# Patient Record
Sex: FEMALE | Race: WHITE | NOT HISPANIC OR LATINO | Employment: OTHER | URBAN - METROPOLITAN AREA
[De-identification: names, ages, dates, MRNs, and addresses within clinical notes are randomized per-mention and may not be internally consistent; named-entity substitution may affect disease eponyms.]

---

## 2020-02-26 ENCOUNTER — OFFICE VISIT (OUTPATIENT)
Dept: UROLOGY | Facility: CLINIC | Age: 70
End: 2020-02-26
Payer: MEDICARE

## 2020-02-26 VITALS
DIASTOLIC BLOOD PRESSURE: 80 MMHG | HEART RATE: 75 BPM | BODY MASS INDEX: 28.16 KG/M2 | HEIGHT: 62 IN | WEIGHT: 153 LBS | SYSTOLIC BLOOD PRESSURE: 130 MMHG

## 2020-02-26 DIAGNOSIS — R32 INCONTINENCE IN FEMALE: Primary | ICD-10-CM

## 2020-02-26 LAB
BILIRUB UR QL STRIP: NEGATIVE
GLUCOSE UR QL STRIP: NEGATIVE
KETONES UR QL STRIP: NEGATIVE
LEUKOCYTE ESTERASE UR QL STRIP: NEGATIVE
PH, POC UA: 5.5
POC AMORP, URINE: ABNORMAL
POC BACTI, URINE: ABNORMAL
POC BLOOD, URINE: POSITIVE
POC CASTS, URINE: ABNORMAL
POC CRYST, URINE: ABNORMAL
POC EPITH, URINE: ABNORMAL
POC HCG, URINE: ABNORMAL
POC HYALIN, URINE: ABNORMAL LPF
POC MUCUS, URINE: ABNORMAL
POC NITRATES, URINE: NEGATIVE
POC OTHER, URINE: ABNORMAL
POC RBC, URINE: ABNORMAL HPF
POC WBC, URINE: ABNORMAL HPF
PROT UR QL STRIP: NEGATIVE
PVR: 50 WU
SP GR UR STRIP: 1.02 (ref 1–1.03)
UROBILINOGEN UR STRIP-ACNC: 0.2 (ref 0.1–1.1)

## 2020-02-26 PROCEDURE — 99213 OFFICE O/P EST LOW 20 MIN: CPT | Mod: S$GLB,,, | Performed by: UROLOGY

## 2020-02-26 PROCEDURE — 99213 PR OFFICE/OUTPT VISIT, EST, LEVL III, 20-29 MIN: ICD-10-PCS | Mod: S$GLB,,, | Performed by: UROLOGY

## 2020-02-26 RX ORDER — ASPIRIN 81 MG/1
81 TABLET ORAL DAILY
COMMUNITY
End: 2021-06-29 | Stop reason: SDUPTHER

## 2020-02-26 RX ORDER — ATORVASTATIN CALCIUM 20 MG/1
TABLET, FILM COATED ORAL
COMMUNITY
Start: 2020-01-27

## 2020-02-26 RX ORDER — CLOPIDOGREL BISULFATE 75 MG/1
TABLET ORAL
COMMUNITY
Start: 2019-12-21

## 2020-02-26 RX ORDER — PANTOPRAZOLE SODIUM 40 MG/1
TABLET, DELAYED RELEASE ORAL
COMMUNITY
Start: 2019-12-21

## 2020-02-26 RX ORDER — NITROGLYCERIN 0.4 MG/1
TABLET SUBLINGUAL
COMMUNITY
Start: 2020-01-07

## 2020-02-26 RX ORDER — MONTELUKAST SODIUM 10 MG/1
TABLET ORAL
COMMUNITY
Start: 2020-01-04

## 2020-02-26 RX ORDER — METOPROLOL SUCCINATE 50 MG/1
TABLET, EXTENDED RELEASE ORAL
COMMUNITY
Start: 2020-02-19

## 2020-02-26 RX ORDER — LINACLOTIDE 72 UG/1
CAPSULE, GELATIN COATED ORAL
COMMUNITY
Start: 2020-01-13 | End: 2022-10-19

## 2020-02-26 NOTE — PROGRESS NOTES
Subjective:       Patient ID: Kinza Cole is a 69 y.o. female.    Chief Complaint: Other (yearly )      HPI: 68 yo female history of nithin etc rectified by surgwry in past.  Denies any leakage.  States on rare occasion will get up at night to urinate.       Past Medical History:   Past Medical History:   Diagnosis Date    GERD (gastroesophageal reflux disease)     High cholesterol     Hypertension     IBS (irritable bowel syndrome)     Macular degeneration disease     Osteoporosis        Past Surgical Historical:   Past Surgical History:   Procedure Laterality Date    CHOLECYSTECTOMY      FOOT SURGERY      NOSE SURGERY          Medications:   Medication List with Changes/Refills   Current Medications    ASPIRIN (ECOTRIN) 81 MG EC TABLET    Take 81 mg by mouth once daily.    ATORVASTATIN (LIPITOR) 20 MG TABLET        CLOPIDOGREL (PLAVIX) 75 MG TABLET        LINZESS 72 MCG CAP CAPSULE        MIRABEGRON (MYRBETRIQ) 25 MG TB24 ER TABLET    Take 1 tablet (25 mg total) by mouth once daily.    MIRABEGRON (MYRBETRIQ) 25 MG TB24 ER TABLET    Myrbetriq 25 mg tablet,extended release    MONTELUKAST (SINGULAIR) 10 MG TABLET        NITROGLYCERIN (NITROSTAT) 0.4 MG SL TABLET        PANTOPRAZOLE (PROTONIX) 40 MG TABLET        TOPROL XL 50 MG 24 HR TABLET            Past Social History:   Social History     Socioeconomic History    Marital status:      Spouse name: Not on file    Number of children: Not on file    Years of education: Not on file    Highest education level: Not on file   Occupational History    Not on file   Social Needs    Financial resource strain: Not on file    Food insecurity:     Worry: Not on file     Inability: Not on file    Transportation needs:     Medical: Not on file     Non-medical: Not on file   Tobacco Use    Smoking status: Never Smoker   Substance and Sexual Activity    Alcohol use: Not Currently    Drug use: Never    Sexual activity: Not on file   Lifestyle    Physical  activity:     Days per week: Not on file     Minutes per session: Not on file    Stress: Not on file   Relationships    Social connections:     Talks on phone: Not on file     Gets together: Not on file     Attends Mu-ism service: Not on file     Active member of club or organization: Not on file     Attends meetings of clubs or organizations: Not on file     Relationship status: Not on file   Other Topics Concern    Not on file   Social History Narrative    Not on file       Allergies:   Review of patient's allergies indicates:   Allergen Reactions    Sulfa (sulfonamide antibiotics)         Family History:   Family History   Problem Relation Age of Onset    Heart disease Father     Kidney cancer Mother         Review of Systems:  Review of Systems   Constitutional: Negative for activity change and appetite change.   HENT: Negative for congestion and dental problem.    Respiratory: Negative for chest tightness and shortness of breath.    Cardiovascular: Negative for chest pain.   Gastrointestinal: Negative for abdominal distention.   Genitourinary: Negative for decreased urine volume, difficulty urinating, dyspareunia, dysuria, enuresis, flank pain, frequency, genital sores, hematuria, pelvic pain and urgency.   Musculoskeletal: Negative for back pain and neck pain.   Allergic/Immunologic: Negative for immunocompromised state.   Neurological: Negative for dizziness.   Hematological: Negative for adenopathy.   Psychiatric/Behavioral: Negative for agitation, behavioral problems and confusion.       Physical Exam:  Physical Exam   Nursing note and vitals reviewed.  Constitutional: She is oriented to person, place, and time. She appears well-developed and well-nourished.   HENT:   Head: Normocephalic.   Cardiovascular: Normal rate, regular rhythm and normal heart sounds.    Pulmonary/Chest: Effort normal and breath sounds normal.   Abdominal: Soft. Bowel sounds are normal.   Genitourinary:          Neurological: She is alert and oriented to person, place, and time.   Skin: Skin is warm and dry.     ua is nl  Catheterized pvr is 50    Assessment/Plan:     history of nithin etc no eveidence for recurrence--fu one year  Problem List Items Addressed This Visit     None      Visit Diagnoses     Incontinence in female    -  Primary    Relevant Orders    POCT Urinalysis (w/Micro Option)    Straight Cath

## 2020-06-17 ENCOUNTER — PATIENT MESSAGE (OUTPATIENT)
Dept: OBSTETRICS AND GYNECOLOGY | Facility: CLINIC | Age: 70
End: 2020-06-17

## 2020-06-17 NOTE — TELEPHONE ENCOUNTER
[facility] and instructions

## 2020-08-04 ENCOUNTER — CLINICAL SUPPORT (OUTPATIENT)
Dept: UROLOGY | Facility: CLINIC | Age: 70
End: 2020-08-04
Payer: MEDICARE

## 2020-08-04 ENCOUNTER — TELEPHONE (OUTPATIENT)
Dept: UROLOGY | Facility: CLINIC | Age: 70
End: 2020-08-04

## 2020-08-04 DIAGNOSIS — R30.0 DYSURIA: Primary | ICD-10-CM

## 2020-08-04 RX ORDER — NITROFURANTOIN (MACROCRYSTALS) 100 MG/1
100 CAPSULE ORAL EVERY 12 HOURS
Qty: 14 CAPSULE | Refills: 0 | Status: SHIPPED | OUTPATIENT
Start: 2020-08-04 | End: 2020-08-11

## 2020-08-04 NOTE — TELEPHONE ENCOUNTER
Patient notified that medication has been called in to  [facility]  Until her culture comes back.  Patient verbalized understanding

## 2020-08-06 ENCOUNTER — TELEPHONE (OUTPATIENT)
Dept: UROLOGY | Facility: CLINIC | Age: 70
End: 2020-08-06

## 2020-08-06 LAB — URINE CULTURE, ROUTINE: NORMAL

## 2020-08-06 NOTE — TELEPHONE ENCOUNTER
----- Message from Melony Abarca sent at 8/6/2020 11:19 AM CDT -----  Regarding: call back  Type:  Patient Returning Call    Who Called:Kinza Cole  Who Left Message for Patient she does not know  Does the patient know what this is regarding?: call back  Would the patient rather a call back or a response via [facility]? no  Best Call Back Number:[phone]

## 2020-08-07 ENCOUNTER — TELEPHONE (OUTPATIENT)
Dept: UROLOGY | Facility: CLINIC | Age: 70
End: 2020-08-07

## 2020-08-07 NOTE — TELEPHONE ENCOUNTER
----- Message from Jann Sierra NP sent at 8/6/2020 10:04 AM CDT -----  Please call the patient regarding her abnormal result.  Notify positive urine culture, sensitive to Macrobid I prescribed.  Continue same till complete

## 2021-04-05 ENCOUNTER — OFFICE VISIT (OUTPATIENT)
Dept: UROLOGY | Facility: CLINIC | Age: 71
End: 2021-04-05
Payer: MEDICARE

## 2021-04-05 VITALS — HEART RATE: 81 BPM | RESPIRATION RATE: 18 BRPM | SYSTOLIC BLOOD PRESSURE: 166 MMHG | DIASTOLIC BLOOD PRESSURE: 68 MMHG

## 2021-04-05 DIAGNOSIS — R30.0 DYSURIA: Primary | ICD-10-CM

## 2021-04-05 LAB — POC RESIDUAL URINE VOLUME: 2 ML (ref 0–100)

## 2021-04-05 PROCEDURE — 99213 PR OFFICE/OUTPT VISIT, EST, LEVL III, 20-29 MIN: ICD-10-PCS | Mod: S$GLB,,, | Performed by: UROLOGY

## 2021-04-05 PROCEDURE — 51798 POCT BLADDER SCAN: ICD-10-PCS | Mod: S$GLB,,, | Performed by: UROLOGY

## 2021-04-05 PROCEDURE — 51798 US URINE CAPACITY MEASURE: CPT | Mod: S$GLB,,, | Performed by: UROLOGY

## 2021-04-05 PROCEDURE — 99213 OFFICE O/P EST LOW 20 MIN: CPT | Mod: S$GLB,,, | Performed by: UROLOGY

## 2021-06-29 ENCOUNTER — OFFICE VISIT (OUTPATIENT)
Dept: OBSTETRICS AND GYNECOLOGY | Facility: CLINIC | Age: 71
End: 2021-06-29
Payer: MEDICARE

## 2021-06-29 VITALS
BODY MASS INDEX: 28.93 KG/M2 | HEIGHT: 62 IN | HEART RATE: 70 BPM | WEIGHT: 157.19 LBS | DIASTOLIC BLOOD PRESSURE: 70 MMHG | SYSTOLIC BLOOD PRESSURE: 120 MMHG

## 2021-06-29 DIAGNOSIS — Z01.419 ENCOUNTER FOR ROUTINE GYNECOLOGIC EXAMINATION IN MEDICARE PATIENT: Primary | ICD-10-CM

## 2021-06-29 PROCEDURE — G0101 CA SCREEN;PELVIC/BREAST EXAM: HCPCS | Mod: S$GLB,,, | Performed by: OBSTETRICS & GYNECOLOGY

## 2021-06-29 PROCEDURE — G0101 PR CA SCREEN;PELVIC/BREAST EXAM: ICD-10-PCS | Mod: S$GLB,,, | Performed by: OBSTETRICS & GYNECOLOGY

## 2021-06-29 RX ORDER — ESTRADIOL 10 UG/1
INSERT VAGINAL
COMMUNITY
End: 2022-06-30 | Stop reason: SDUPTHER

## 2021-06-29 RX ORDER — DENOSUMAB 60 MG/ML
60 INJECTION SUBCUTANEOUS
COMMUNITY

## 2022-03-07 ENCOUNTER — TELEPHONE (OUTPATIENT)
Dept: UROLOGY | Facility: CLINIC | Age: 72
End: 2022-03-07
Payer: MEDICARE

## 2022-03-07 NOTE — TELEPHONE ENCOUNTER
I called pt to notify we received fax from [facility] to refill myrbetriq but no upcoming appointments and pt last saw Dr CORNELL. She states she thinks her pcp can fill for her. I advised to call us for any issues related to that.

## 2022-06-30 ENCOUNTER — OFFICE VISIT (OUTPATIENT)
Dept: OBSTETRICS AND GYNECOLOGY | Facility: CLINIC | Age: 72
End: 2022-06-30
Payer: MEDICARE

## 2022-06-30 VITALS
HEIGHT: 62 IN | BODY MASS INDEX: 28.31 KG/M2 | HEART RATE: 70 BPM | DIASTOLIC BLOOD PRESSURE: 57 MMHG | SYSTOLIC BLOOD PRESSURE: 130 MMHG | WEIGHT: 153.81 LBS

## 2022-06-30 DIAGNOSIS — N76.2 ACUTE VULVITIS: ICD-10-CM

## 2022-06-30 DIAGNOSIS — N95.2 VAGINAL ATROPHY: Primary | ICD-10-CM

## 2022-06-30 PROCEDURE — 99213 PR OFFICE/OUTPT VISIT, EST, LEVL III, 20-29 MIN: ICD-10-PCS | Mod: S$GLB,,, | Performed by: OBSTETRICS & GYNECOLOGY

## 2022-06-30 PROCEDURE — 99213 OFFICE O/P EST LOW 20 MIN: CPT | Mod: S$GLB,,, | Performed by: OBSTETRICS & GYNECOLOGY

## 2022-06-30 RX ORDER — CYCLOBENZAPRINE HCL 5 MG
5 TABLET ORAL 2 TIMES DAILY
COMMUNITY
Start: 2022-05-24 | End: 2022-10-19

## 2022-06-30 RX ORDER — ESTRADIOL 0.1 MG/G
0.5 CREAM VAGINAL DAILY
Qty: 42.5 G | Refills: 1 | Status: SHIPPED | OUTPATIENT
Start: 2022-06-30 | End: 2023-06-30

## 2022-06-30 RX ORDER — ESTRADIOL 10 UG/1
1 INSERT VAGINAL
Qty: 24 TABLET | Refills: 4 | Status: SHIPPED | OUTPATIENT
Start: 2022-06-30

## 2022-06-30 NOTE — PROGRESS NOTES
"  Chief Complaint: vulvar itching     HPI:      Kinza Cole is a 71 y.o. female  who presents complaining of pruritus at the medial aspect of the labia.  She used a moisturizer and things seemed to improve.  No LMP recorded. Patient is postmenopausal.    Past Medical History:   Diagnosis Date    GERD (gastroesophageal reflux disease)     Hyperlipidemia     Hypertension     IBS (irritable bowel syndrome)     Incontinence of urine in female     Long-term current use of testosterone replacement therapy     Macular degeneration disease     Osteoporosis       Past Surgical History:   Procedure Laterality Date    CHOLECYSTECTOMY      DILATION AND CURETTAGE OF UTERUS      FOOT SURGERY      NOSE SURGERY        Social History     Tobacco Use    Smoking status: Never Smoker    Smokeless tobacco: Never Used   Substance Use Topics    Alcohol use: Yes     Comment: "occasionally"    Drug use: Never      Current Outpatient Medications on File Prior to Visit   Medication Sig Dispense Refill    aspirin (ASPIR-81 ORAL) Aspir-81      atorvastatin (LIPITOR) 20 MG tablet       clopidogreL (PLAVIX) 75 mg tablet       cyclobenzaprine (FLEXERIL) 5 MG tablet Take 5 mg by mouth 2 (two) times daily.      denosumab (PROLIA) 60 mg/mL Syrg Inject 60 mg into the skin every 6 (six) months.      LINZESS 72 mcg Cap capsule       mirabegron (MYRBETRIQ) 25 mg Tb24 ER tablet Take 1 tablet (25 mg total) by mouth once daily. 90 tablet 3    montelukast (SINGULAIR) 10 mg tablet       nitroGLYCERIN (NITROSTAT) 0.4 MG SL tablet       pantoprazole (PROTONIX) 40 MG tablet       TOPROL XL 50 mg 24 hr tablet       [DISCONTINUED] estradioL (VAGIFEM) 10 mcg Tab Place vaginally twice a week.      [DISCONTINUED] famotidine (PEPCID ORAL) Take by mouth as needed.       No current facility-administered medications on file prior to visit.      Review of patient's allergies indicates:   Allergen Reactions    Sulfa (sulfonamide " "antibiotics)           ROS:     GENERAL: Denies weight gain or weight loss. Feeling well overall.   SKIN: Denies rash or lesions.   NODES: Denies enlarged lymph nodes.   CARDIOVASCULAR: Denies palpitations or chest pain.   ABDOMEN: Denies abdominal pain, constipation, diarrhea, nausea, vomiting or rectal bleeding.   URINARY: Denies frequency, dysuria, hematuria, or burning on urination.  REPRODUCTIVE: See HPI.   BREASTS: Denies pain, lumps, or nipple discharge.   HEMATOLOGIC: Denies easy bruisability or excessive bleeding with the exception of menstrual cycles.  PSYCHIATRIC: Denies depression, anxiety or mood swings.   Physical Exam:      BP (!) 130/57   Pulse 70   Ht 5' 2" (1.575 m)   Wt 69.8 kg (153 lb 12.8 oz)   BMI 28.13 kg/m²   Body mass index is 28.13 kg/m².     PELVIC: Normal external genitalia but with pale thin skin at medial aspect of labia and includes the perineum and perianal area      Assessment/Plan:     Vaginal atrophy  -     estradioL (VAGIFEM) 10 mcg Tab; Place 1 tablet (10 mcg total) vaginally twice a week.  Dispense: 24 tablet; Refill: 4    Acute vulvitis  -     estradioL (ESTRACE) 0.01 % (0.1 mg/gram) vaginal cream; Place 0.5 g vaginally once daily.  Dispense: 42.5 g; Refill: 1        Follow up in 2 weeks  "

## 2022-07-06 ENCOUNTER — TELEPHONE (OUTPATIENT)
Dept: OBSTETRICS AND GYNECOLOGY | Facility: CLINIC | Age: 72
End: 2022-07-06
Payer: MEDICARE

## 2022-07-14 ENCOUNTER — OFFICE VISIT (OUTPATIENT)
Dept: OBSTETRICS AND GYNECOLOGY | Facility: CLINIC | Age: 72
End: 2022-07-14
Payer: MEDICARE

## 2022-07-14 VITALS
HEART RATE: 87 BPM | WEIGHT: 158 LBS | BODY MASS INDEX: 29.08 KG/M2 | SYSTOLIC BLOOD PRESSURE: 143 MMHG | HEIGHT: 62 IN | DIASTOLIC BLOOD PRESSURE: 72 MMHG

## 2022-07-14 DIAGNOSIS — N95.2 VAGINAL ATROPHY: Primary | ICD-10-CM

## 2022-07-14 PROCEDURE — 99213 OFFICE O/P EST LOW 20 MIN: CPT | Mod: S$GLB,,, | Performed by: OBSTETRICS & GYNECOLOGY

## 2022-07-14 PROCEDURE — 99213 PR OFFICE/OUTPT VISIT, EST, LEVL III, 20-29 MIN: ICD-10-PCS | Mod: S$GLB,,, | Performed by: OBSTETRICS & GYNECOLOGY

## 2022-07-14 NOTE — PROGRESS NOTES
"  Chief Complaint: follow up of treatment for vaginal irritation     HPI:      Kinza Cole is a 71 y.o. female  who presents after treatment with Vagifem and Estradiol cream.   No LMP recorded. Patient is postmenopausal. Itching got better and then started a little again yesterday after using different Cetaphil soap.    Past Medical History:   Diagnosis Date    GERD (gastroesophageal reflux disease)     Hyperlipidemia     Hypertension     IBS (irritable bowel syndrome)     Incontinence of urine in female     Macular degeneration disease     Osteoporosis       Past Surgical History:   Procedure Laterality Date    CHOLECYSTECTOMY      DILATION AND CURETTAGE OF UTERUS      FOOT SURGERY      NOSE SURGERY        Social History     Tobacco Use    Smoking status: Never Smoker    Smokeless tobacco: Never Used   Substance Use Topics    Alcohol use: Yes     Comment: "occasionally"    Drug use: Never      Current Outpatient Medications on File Prior to Visit   Medication Sig Dispense Refill    aspirin (ASPIR-81 ORAL) Aspir-81      atorvastatin (LIPITOR) 20 MG tablet       clopidogreL (PLAVIX) 75 mg tablet       cyclobenzaprine (FLEXERIL) 5 MG tablet Take 5 mg by mouth 2 (two) times daily.      denosumab (PROLIA) 60 mg/mL Syrg Inject 60 mg into the skin every 6 (six) months.      estradioL (ESTRACE) 0.01 % (0.1 mg/gram) vaginal cream Place 0.5 g vaginally once daily. 42.5 g 1    estradioL (VAGIFEM) 10 mcg Tab Place 1 tablet (10 mcg total) vaginally twice a week. 24 tablet 4    LINZESS 72 mcg Cap capsule       mirabegron (MYRBETRIQ) 25 mg Tb24 ER tablet Take 1 tablet (25 mg total) by mouth once daily. 90 tablet 3    montelukast (SINGULAIR) 10 mg tablet       nitroGLYCERIN (NITROSTAT) 0.4 MG SL tablet       pantoprazole (PROTONIX) 40 MG tablet       TOPROL XL 50 mg 24 hr tablet        No current facility-administered medications on file prior to visit.      Review of patient's allergies indicates: " "  Allergen Reactions    Sulfa (sulfonamide antibiotics)           ROS:     GENERAL: Denies weight gain or weight loss. Feeling well overall.   SKIN: Denies rash or lesions.   NODES: Denies enlarged lymph nodes.   CARDIOVASCULAR: Denies palpitations or chest pain.   ABDOMEN: Denies abdominal pain, constipation, diarrhea, nausea, vomiting or rectal bleeding.   URINARY: Denies frequency, dysuria, hematuria, or burning on urination.  REPRODUCTIVE: See HPI.   BREASTS: Denies pain, lumps, or nipple discharge.   HEMATOLOGIC: Denies easy bruisability or excessive bleeding   PSYCHIATRIC: Denies depression, anxiety or mood swings.   Physical Exam:      BP (!) 143/72   Pulse 87   Ht 5' 2" (1.575 m)   Wt 71.7 kg (158 lb)   BMI 28.90 kg/m²   Body mass index is 28.9 kg/m².       PELVIC: Normal external genitalia without lesions. Generalized skin pallor around introitus and labia minora. Vagina without lesions or discharge        Assessment/Plan:     Vaginal atrophy      Improving  Continue estradiol cream topically and Vagifem twice weekly  Discussed using just mild soap with showering.    "

## 2022-08-30 ENCOUNTER — OFFICE VISIT (OUTPATIENT)
Dept: OBSTETRICS AND GYNECOLOGY | Facility: CLINIC | Age: 72
End: 2022-08-30
Payer: MEDICARE

## 2022-08-30 ENCOUNTER — PROCEDURE VISIT (OUTPATIENT)
Dept: OBSTETRICS AND GYNECOLOGY | Facility: CLINIC | Age: 72
End: 2022-08-30
Payer: MEDICARE

## 2022-08-30 VITALS
SYSTOLIC BLOOD PRESSURE: 132 MMHG | WEIGHT: 149.63 LBS | DIASTOLIC BLOOD PRESSURE: 69 MMHG | BODY MASS INDEX: 27.36 KG/M2

## 2022-08-30 DIAGNOSIS — R10.31 ABDOMINAL PAIN, RLQ: Primary | ICD-10-CM

## 2022-08-30 DIAGNOSIS — R10.31 ABDOMINAL PAIN, RLQ: ICD-10-CM

## 2022-08-30 PROCEDURE — 99213 PR OFFICE/OUTPT VISIT, EST, LEVL III, 20-29 MIN: ICD-10-PCS | Mod: 25,S$GLB,, | Performed by: OBSTETRICS & GYNECOLOGY

## 2022-08-30 PROCEDURE — 76830 US OB/GYN PROCEDURE (VIEWPOINT): ICD-10-PCS | Mod: S$GLB,,, | Performed by: OBSTETRICS & GYNECOLOGY

## 2022-08-30 PROCEDURE — 99213 OFFICE O/P EST LOW 20 MIN: CPT | Mod: 25,S$GLB,, | Performed by: OBSTETRICS & GYNECOLOGY

## 2022-08-30 PROCEDURE — 76830 TRANSVAGINAL US NON-OB: CPT | Mod: S$GLB,,, | Performed by: OBSTETRICS & GYNECOLOGY

## 2022-08-30 NOTE — PROGRESS NOTES
"  Chief Complaint: right ovarian pain     HPI:      Kinza Cole is a 72 y.o. female  who presents complaining of intermittent episodes of RLQ pain in the last few months.  It was severe enough to wake her from sleep on night.  She hurt yesterday and not today. It reminded her of when she had ovarian cysts rupture in the past.  No LMP recorded. Patient is postmenopausal.    Past Medical History:   Diagnosis Date    GERD (gastroesophageal reflux disease)     Hyperlipidemia     Hypertension     IBS (irritable bowel syndrome)     Incontinence of urine in female     Macular degeneration disease     Osteoporosis       Past Surgical History:   Procedure Laterality Date    CHOLECYSTECTOMY      DILATION AND CURETTAGE OF UTERUS      FOOT SURGERY      NOSE SURGERY        Social History     Tobacco Use    Smoking status: Never    Smokeless tobacco: Never   Substance Use Topics    Alcohol use: Yes     Comment: "occasionally"    Drug use: Never      Current Outpatient Medications on File Prior to Visit   Medication Sig Dispense Refill    aspirin (ASPIR-81 ORAL) Aspir-81      atorvastatin (LIPITOR) 20 MG tablet       clopidogreL (PLAVIX) 75 mg tablet       cyclobenzaprine (FLEXERIL) 5 MG tablet Take 5 mg by mouth 2 (two) times daily.      denosumab (PROLIA) 60 mg/mL Syrg Inject 60 mg into the skin every 6 (six) months.      estradioL (ESTRACE) 0.01 % (0.1 mg/gram) vaginal cream Place 0.5 g vaginally once daily. 42.5 g 1    estradioL (VAGIFEM) 10 mcg Tab Place 1 tablet (10 mcg total) vaginally twice a week. 24 tablet 4    LINZESS 72 mcg Cap capsule       montelukast (SINGULAIR) 10 mg tablet       nitroGLYCERIN (NITROSTAT) 0.4 MG SL tablet       pantoprazole (PROTONIX) 40 MG tablet       TOPROL XL 50 mg 24 hr tablet       mirabegron (MYRBETRIQ) 25 mg Tb24 ER tablet Take 1 tablet (25 mg total) by mouth once daily. 90 tablet 3     No current facility-administered medications on file prior to visit.      Review of patient's " allergies indicates:   Allergen Reactions    Sulfa (sulfonamide antibiotics)           ROS:     GENERAL: Denies weight gain or weight loss. Feeling well overall.   SKIN: Denies rash or lesions.   NODES: Denies enlarged lymph nodes.   CARDIOVASCULAR: Denies palpitations or chest pain.   ABDOMEN: Denies abdominal pain, constipation, diarrhea, nausea, vomiting or rectal bleeding.   URINARY: Denies frequency, dysuria, hematuria, or burning on urination.  REPRODUCTIVE: See HPI.   BREASTS: Denies pain, lumps, or nipple discharge.   HEMATOLOGIC: Denies easy bruisability or excessive bleeding with the exception of menstrual cycles.  PSYCHIATRIC: Denies depression, anxiety or mood swings.   Physical Exam:      /69   Wt 67.9 kg (149 lb 9.6 oz)   BMI 27.36 kg/m²   Body mass index is 27.36 kg/m².     ABDOMEN: Soft.  No tenderness or masses. No hepatoslenomegaly. No hernias.   PELVIC: Normal external genitalia without lesions.  Normal hair distribution.  Adequate perineal body, normal urethral meatus.  Urethra and bladder without tenderness or masses. Vagina moist and well rugated without lesions or discharge.  Cervix pink, without lesions, discharge or tenderness.  No significant cystocele or rectocele.  Bimanual exam shows uterus to be normal size, regular, mobile and nontender.  Adnexa without masses or tenderness.        Assessment/Plan:     Abdominal pain, RLQ  -     US OB/GYN Procedure ([facility]); Future

## 2022-08-31 ENCOUNTER — PROCEDURE VISIT (OUTPATIENT)
Dept: OBSTETRICS AND GYNECOLOGY | Facility: CLINIC | Age: 72
End: 2022-08-31
Payer: MEDICARE

## 2022-08-31 VITALS
DIASTOLIC BLOOD PRESSURE: 69 MMHG | HEART RATE: 90 BPM | SYSTOLIC BLOOD PRESSURE: 144 MMHG | WEIGHT: 151 LBS | HEIGHT: 62 IN | BODY MASS INDEX: 27.79 KG/M2

## 2022-08-31 DIAGNOSIS — R10.31 ABDOMINAL PAIN, RLQ: ICD-10-CM

## 2022-08-31 DIAGNOSIS — D21.9 FIBROID: ICD-10-CM

## 2022-08-31 DIAGNOSIS — R93.89 ENDOMETRIAL THICKENING ON ULTRASOUND: Primary | ICD-10-CM

## 2022-08-31 PROCEDURE — 58100 ENDOMETRIAL BIOPSY: ICD-10-PCS | Mod: S$GLB,,, | Performed by: OBSTETRICS & GYNECOLOGY

## 2022-08-31 PROCEDURE — 58100 BIOPSY OF UTERUS LINING: CPT | Mod: S$GLB,,, | Performed by: OBSTETRICS & GYNECOLOGY

## 2022-08-31 NOTE — PROCEDURES
Endometrial biopsy    Date/Time: 8/31/2022 2:30 PM  Performed by: Anna Bernal MD  Authorized by: Anna Bernal MD     Consent:     Consent obtained:  Verbal    Consent given by:  Patient    Patient questions answered: yes      Patient agrees, verbalizes understanding, and wants to proceed: yes    Indication:     Indications comment:  Thickened endometrial stripe on US  Pre-procedure:     Pre-procedure timeout performed: yes    Procedure:     Cervix cleaned and prepped: yes      The cervix was dilated: yes      Unable to perform due to: cervical stenosis    Comments:     Procedure comments:  Due to stenosis of internal os, no sample was obtained. Decision to proceed with D&C was made. Patient and  understand and agree.

## 2022-09-01 ENCOUNTER — TELEPHONE (OUTPATIENT)
Dept: OBSTETRICS AND GYNECOLOGY | Facility: CLINIC | Age: 72
End: 2022-09-01
Payer: MEDICARE

## 2022-09-01 LAB
ANION GAP SERPL CALC-SCNC: 7 MMOL/L (ref 3–11)
APPEARANCE, UA: CLEAR
BASOPHILS NFR BLD: 0.8 % (ref 0–3)
BILIRUB UR QL STRIP: NEGATIVE MG/DL
BUN SERPL-MCNC: 13 MG/DL (ref 7–18)
BUN/CREAT SERPL: 15.29 RATIO (ref 7–18)
CALCIUM SERPL-MCNC: 9.2 MG/DL (ref 8.8–10.5)
CHLORIDE SERPL-SCNC: 103 MMOL/L (ref 100–108)
CO2 SERPL-SCNC: 31 MMOL/L (ref 21–32)
COLOR UR: NORMAL
CREAT SERPL-MCNC: 0.85 MG/DL (ref 0.55–1.02)
EOSINOPHIL NFR BLD: 2.3 % (ref 1–3)
ERYTHROCYTE [DISTWIDTH] IN BLOOD BY AUTOMATED COUNT: 13.6 % (ref 12.5–18)
GFR ESTIMATION: > 60
GLUCOSE (UA): NORMAL MG/DL
GLUCOSE SERPL-MCNC: 122 MG/DL (ref 70–110)
HCT VFR BLD AUTO: 37 % (ref 37–47)
HGB BLD-MCNC: 12.6 G/DL (ref 12–16)
HGB UR QL STRIP: NEGATIVE /UL
KETONES UR QL STRIP: NEGATIVE MG/DL
LEUKOCYTE ESTERASE UR QL STRIP: NEGATIVE /UL
LYMPHOCYTES NFR BLD: 24.5 % (ref 25–40)
MCH RBC QN AUTO: 30.3 PG (ref 27–31.2)
MCHC RBC AUTO-ENTMCNC: 34.1 G/DL (ref 31.8–35.4)
MCV RBC AUTO: 88.9 FL (ref 80–97)
MONOCYTES NFR BLD: 7.2 % (ref 1–15)
NEUTROPHILS # BLD AUTO: 5.06 10*3/UL (ref 1.8–7.7)
NEUTROPHILS NFR BLD: 64.2 % (ref 37–80)
NITRITE UR QL STRIP: NEGATIVE
NUCLEATED RED BLOOD CELLS: 0 %
PH UR STRIP: 8 PH (ref 5–9)
PLATELETS: 230 10*3/UL (ref 142–424)
POTASSIUM SERPL-SCNC: 4 MMOL/L (ref 3.6–5.2)
PROT UR QL STRIP: NEGATIVE MG/DL
RBC # BLD AUTO: 4.16 10*6/UL (ref 4.2–5.4)
RPR: NON REACTIVE
SODIUM BLD-SCNC: 141 MMOL/L (ref 135–145)
SP GR UR STRIP: 1.01 (ref 1–1.03)
SPECIMEN COLLECTION METHOD, URINE: NORMAL
UROBILINOGEN UR STRIP-ACNC: NORMAL MG/DL
WBC # BLD: 7.9 10*3/UL (ref 4.6–10.2)

## 2022-09-01 NOTE — TELEPHONE ENCOUNTER
----- Message from Bailey Lema sent at 9/1/2022 10:24 AM CDT -----  Contact: PT      Who Called: Kinza     Does the patient know what this is regarding?: Dr De Los Santos is requesting medical clearance form to complete by fax        Would the patient rather a call back or a response via [facility]?  Callback   Best Call Back Number : [phone] (home)      Additional Information:  [phone] - office

## 2022-09-01 NOTE — TELEPHONE ENCOUNTER
----- Message from Rebecca Alonso sent at 9/1/2022  2:37 PM CDT -----  Type: Staff Message    Who called: christ[facility]  Reason for the call: needs clearance form to be sent to them  Call back number: [phone]  Fax number: [phone] (please put attn: christiano)  Patient name and/or MRN: Kinza SHEPHERD Douglas / [id number]   Additional information: n/a

## 2022-09-01 NOTE — TELEPHONE ENCOUNTER
----- Message from Rebecca Brown sent at 9/1/2022  8:25 AM CDT -----  Regarding: Medication Question  Pt is scheduled for Hysteroscopy D&C with Dr. Bernal on Tuesday 9/6/22. Spoke with her this morning and she has a question for Dr. Bernal in regards to her Plavix. Callback number is [phone].

## 2022-09-01 NOTE — TELEPHONE ENCOUNTER
Does pt. Need to d/c Plavix?  Dr. King De Los Santos prescribes it for her.  Dr. Bernal consulted and pt. Needs to contact his office to fax instructions to Dr. Bernal.  Pt. Notified and verbalized understanding.  Our fax # was given to pt. To give to Dr. Rocha office for cardiac clearance due to pt. Having heart stents.

## 2022-09-01 NOTE — TELEPHONE ENCOUNTER
[email or web address] with Barbi (Dr. King De Los Santos's Nurse) to call us back to verify that Patricia received the form for cardiac clearance.

## 2022-09-06 ENCOUNTER — TELEPHONE (OUTPATIENT)
Dept: OBSTETRICS AND GYNECOLOGY | Facility: CLINIC | Age: 72
End: 2022-09-06

## 2022-09-06 ENCOUNTER — OUTSIDE PLACE OF SERVICE (OUTPATIENT)
Dept: OBSTETRICS AND GYNECOLOGY | Facility: CLINIC | Age: 72
End: 2022-09-06

## 2022-09-06 DIAGNOSIS — G89.18 POSTOPERATIVE PAIN: Primary | ICD-10-CM

## 2022-09-06 PROCEDURE — 58558 HYSTEROSCOPY BIOPSY: CPT | Mod: ,,, | Performed by: OBSTETRICS & GYNECOLOGY

## 2022-09-06 PROCEDURE — 58558 PR HYSTEROSCOPY,W/ENDO BX: ICD-10-PCS | Mod: ,,, | Performed by: OBSTETRICS & GYNECOLOGY

## 2022-09-06 RX ORDER — HYDROCODONE BITARTRATE AND ACETAMINOPHEN 5; 325 MG/1; MG/1
1 TABLET ORAL EVERY 6 HOURS PRN
Qty: 4 TABLET | Refills: 0 | Status: SHIPPED | OUTPATIENT
Start: 2022-09-06 | End: 2022-10-19

## 2022-09-06 NOTE — TELEPHONE ENCOUNTER
----- Message from Angelica Ontiveros sent at 9/6/2022 12:30 PM CDT -----  Contact: self  Patient had surgery this am. Patient stated she want be back in town till the week of Oct 17th for her follow up appt. Please call [phone]

## 2022-09-07 LAB — SPECIMEN TO PATHOLOGY: NORMAL

## 2022-10-19 ENCOUNTER — OFFICE VISIT (OUTPATIENT)
Dept: OBSTETRICS AND GYNECOLOGY | Facility: CLINIC | Age: 72
End: 2022-10-19
Payer: MEDICARE

## 2022-10-19 VITALS
BODY MASS INDEX: 27.79 KG/M2 | HEART RATE: 80 BPM | WEIGHT: 151 LBS | HEIGHT: 62 IN | DIASTOLIC BLOOD PRESSURE: 80 MMHG | SYSTOLIC BLOOD PRESSURE: 130 MMHG

## 2022-10-19 DIAGNOSIS — Z09 POSTOP CHECK: Primary | ICD-10-CM

## 2022-10-19 PROCEDURE — 99024 PR POST-OP FOLLOW-UP VISIT: ICD-10-PCS | Mod: S$GLB,POP,, | Performed by: OBSTETRICS & GYNECOLOGY

## 2022-10-19 PROCEDURE — 99024 POSTOP FOLLOW-UP VISIT: CPT | Mod: S$GLB,POP,, | Performed by: OBSTETRICS & GYNECOLOGY

## 2022-10-19 NOTE — PROGRESS NOTES
"CC: Postoperative visit    Kinza Cole is a 72 y.o. female  presents for a postoperative visit s/p hysteroscopy, D&C on 22.  Her postoperative course was uncomplicated.  She is doing well postoperative.    ROS:  GENERAL: No fever, chills, fatigability or weight loss.  VULVAR: No pain, no lesions and no itching.  VAGINAL: No relaxation, no itching, no discharge, no abnormal bleeding and no lesions.  ABDOMEN: No abdominal pain. Denies nausea. Denies vomiting. No diarrhea. No constipation  URINARY: No incontinence, no nocturia, no frequency and no dysuria.  CARDIOVASCULAR: No chest pain. No shortness of breath. No leg cramps.  NEUROLOGICAL: No headaches. No vision changes.    PHYSICAL EXAM:  /80 (BP Location: Left arm, Patient Position: Sitting, BP Method: Large (Manual))   Pulse 80   Ht 5' 2" (1.575 m)   Wt 68.5 kg (151 lb)   BMI 27.62 kg/m²    Body mass index is 27.62 kg/m².     PELVIC: Normal external genitalia without lesions.  Normal hair distribution.  Adequate perineal body, normal urethral meatus.  Vagina without lesions or discharge.  Cervix pink, without lesions, discharge or tenderness.       Postop check       Patient can return to normal activities.  Return to clinic in 1 year for well woman exam.      "

## 2023-03-07 ENCOUNTER — TELEPHONE (OUTPATIENT)
Dept: OBSTETRICS AND GYNECOLOGY | Facility: CLINIC | Age: 73
End: 2023-03-07
Payer: MEDICARE

## 2023-03-07 NOTE — TELEPHONE ENCOUNTER
----- Message from Anna Mart MA sent at 3/7/2023 10:10 AM CST -----  Contact: self    ----- Message -----  From: Connie Macedo  Sent: 3/7/2023  10:03 AM CST  To: Gabe Castillo Staff    Pt needs a refill of testosterone cream pt wants it changed back to the original dosage, pls call [phone]/ [phone] with any further questions      [facility]  [address] , [place] [address]  [phone]

## 2023-03-08 NOTE — TELEPHONE ENCOUNTER
Med called out.  Pt. Aware.  Dr. Bernal spoke with pt. And advised her on how to apply the cream.      Pt. Wants rx sent to [facility].    Dr. Bernal will call out new Testosterone Crm to [facility].  Pt. Aware.

## 2023-03-08 NOTE — TELEPHONE ENCOUNTER
Please call in refills to [facility] for testosterone cream 3 mg/ml #30d supply with 5 refills with instructions to apply 3 mg daily. Please notify pt

## 2024-12-04 ENCOUNTER — OFFICE VISIT (OUTPATIENT)
Dept: OBSTETRICS AND GYNECOLOGY | Facility: CLINIC | Age: 74
End: 2024-12-04
Payer: MEDICARE

## 2024-12-04 DIAGNOSIS — N95.2 ATROPHIC VULVOVAGINITIS: ICD-10-CM

## 2024-12-04 DIAGNOSIS — R87.619 ABNORMAL CERVICAL PAPANICOLAOU SMEAR, UNSPECIFIED ABNORMAL PAP FINDING: ICD-10-CM

## 2024-12-04 DIAGNOSIS — Z12.4 SCREENING FOR MALIGNANT NEOPLASM OF THE CERVIX: Primary | ICD-10-CM

## 2024-12-04 PROCEDURE — 99213 OFFICE O/P EST LOW 20 MIN: CPT | Mod: S$PBB,,, | Performed by: OBSTETRICS & GYNECOLOGY

## 2024-12-04 RX ORDER — CLOBETASOL PROPIONATE 0.5 MG/G
CREAM TOPICAL
COMMUNITY

## 2024-12-04 RX ORDER — SIMETHICONE 125 MG
CAPSULE ORAL
COMMUNITY

## 2024-12-04 RX ORDER — ISOSORBIDE MONONITRATE 30 MG/1
30 TABLET, EXTENDED RELEASE ORAL
COMMUNITY

## 2024-12-04 RX ORDER — ESTRADIOL 0.1 MG/G
1 CREAM VAGINAL DAILY
Qty: 42.5 G | Refills: 4 | Status: SHIPPED | OUTPATIENT
Start: 2024-12-04 | End: 2024-12-04

## 2024-12-04 RX ORDER — ESTRADIOL 0.1 MG/G
1 CREAM VAGINAL DAILY
Qty: 42.5 G | Refills: 1 | Status: SHIPPED | OUTPATIENT
Start: 2024-12-04 | End: 2025-12-04

## 2024-12-04 RX ORDER — LORATADINE 10 MG/1
1 TABLET ORAL DAILY
COMMUNITY

## 2024-12-04 RX ORDER — PNV NO.95/FERROUS FUM/FOLIC AC 28MG-0.8MG
1 TABLET ORAL DAILY
COMMUNITY

## 2024-12-04 RX ORDER — FENOFIBRATE 160 MG/1
1 TABLET ORAL DAILY
COMMUNITY

## 2024-12-04 NOTE — PROGRESS NOTES
A 74-year-old female who previous Pap smear was ASCUS also with atrophy no dysplastic cells HPV was not done previous Pap smear with the same were normal so I am going to will just repeat make sure we get high-risk HPV I advised her this is all to me normal on exam her vulva is does appear very thin so is a vagina and will put her on little Estrace externally because she has had some itching that she use some over-the-counter cream for a she definitely appears atrophic spent about 20 minutes talking to the patient explaining her abnormal Pap at 1 time she was on Vagifem vaginally periods I will put her on Estrace vaginal cream every other night and see her back in 6 months

## 2025-06-11 ENCOUNTER — OFFICE VISIT (OUTPATIENT)
Dept: OBSTETRICS AND GYNECOLOGY | Facility: CLINIC | Age: 75
End: 2025-06-11
Payer: MEDICARE

## 2025-06-11 VITALS
SYSTOLIC BLOOD PRESSURE: 132 MMHG | WEIGHT: 149 LBS | DIASTOLIC BLOOD PRESSURE: 73 MMHG | BODY MASS INDEX: 27.42 KG/M2 | HEIGHT: 62 IN | HEART RATE: 81 BPM

## 2025-06-11 DIAGNOSIS — N76.3 CHRONIC VULVITIS: Primary | ICD-10-CM

## 2025-06-11 PROCEDURE — 99213 OFFICE O/P EST LOW 20 MIN: CPT | Mod: S$PBB,,, | Performed by: OBSTETRICS & GYNECOLOGY

## 2025-06-11 RX ORDER — METOPROLOL SUCCINATE 50 MG/1
50 TABLET, EXTENDED RELEASE ORAL
COMMUNITY
Start: 2025-04-24

## 2025-06-11 RX ORDER — ISOSORBIDE MONONITRATE 30 MG/1
30 TABLET, EXTENDED RELEASE ORAL
COMMUNITY
Start: 2024-12-16 | End: 2025-06-11

## 2025-06-11 RX ORDER — CICLOPIROX OLAMINE 7.7 MG/G
1 CREAM TOPICAL DAILY PRN
COMMUNITY
Start: 2025-05-07 | End: 2026-05-07

## 2025-06-11 RX ORDER — TRIAMCINOLONE ACETONIDE 1 MG/G
OINTMENT TOPICAL 2 TIMES DAILY
Qty: 1 EACH | Refills: 4 | Status: SHIPPED | OUTPATIENT
Start: 2025-06-11 | End: 2025-12-08

## 2025-06-11 RX ORDER — CLOPIDOGREL BISULFATE 75 MG/1
1 TABLET ORAL DAILY
COMMUNITY

## 2025-06-11 RX ORDER — OMEGA-3 FATTY ACIDS 1000 MG
CAPSULE ORAL
COMMUNITY

## 2025-06-11 RX ORDER — MIRABEGRON 25 MG/1
1 TABLET, FILM COATED, EXTENDED RELEASE ORAL DAILY
COMMUNITY

## 2025-06-11 RX ORDER — ESTRADIOL 0.1 MG/G
1 CREAM VAGINAL DAILY
Qty: 42.5 G | Refills: 3 | Status: SHIPPED | OUTPATIENT
Start: 2025-06-11 | End: 2026-06-11

## 2025-06-11 RX ORDER — PANTOPRAZOLE SODIUM 40 MG/1
40 TABLET, DELAYED RELEASE ORAL
COMMUNITY
Start: 2025-02-05

## 2025-06-11 RX ORDER — CHLORHEXIDINE GLUCONATE ORAL RINSE 1.2 MG/ML
SOLUTION DENTAL
COMMUNITY
Start: 2025-03-27

## 2025-06-11 RX ORDER — CELECOXIB 200 MG/1
200 CAPSULE ORAL
COMMUNITY
Start: 2024-12-17

## 2025-06-11 RX ORDER — MUPIROCIN 20 MG/G
OINTMENT TOPICAL
COMMUNITY
Start: 2025-05-07

## 2025-06-11 RX ORDER — MONTELUKAST SODIUM 10 MG/1
10 TABLET ORAL
COMMUNITY
Start: 2025-05-28

## 2025-06-11 RX ORDER — EZETIMIBE 10 MG/1
10 TABLET ORAL EVERY MORNING
COMMUNITY
Start: 2025-04-15

## 2025-06-11 NOTE — PROGRESS NOTES
A 74-year-old female who has a history of vulvar itching was seen before diagnosed with atrophy and put on Estrace vaginal cream which she said is too messy therefore she was put on Vagifem 10 mcg 2 or 3 times a week still has a little bit of itching but not not severe on examination the vulva looks definitely like old chronic irritation as she has lost the pigment consistent with vitiligo what she is also fairly erythematous therefore I am going to put her on some Estrace vaginal cream to use only on the vulva in the morning and little Kenalog to use it night I will see her back in 3 months